# Patient Record
(demographics unavailable — no encounter records)

---

## 2024-11-22 NOTE — PLAN
[FreeTextEntry1] : [facility] very early pregnancy will need follow up sono to contfrm viability and determine EDC prenatal bloods drawn n/v 2 wks.

## 2024-11-22 NOTE — HISTORY OF PRESENT ILLNESS
[FreeTextEntry1] : 21 yo   for 9  months having somewhat irreg cycles. 28-35 day interval no sig med hx on no meds NKDA pos preg test at home LMP 10/06/24 had scant pink on toilet tissue once, nothing since no cramping

## 2024-11-22 NOTE — PHYSICAL EXAM
[Chaperone Present] : A chaperone was present in the examining room during all aspects of the physical examination [[id number]] : A chaperone was present during the pelvic exam. [Soft] : soft [Non-tender] : non-tender [Non-distended] : non-distended [No Mass] : no mass [Oriented x3] : oriented x3 [Examination Of The Breasts] : a normal appearance [No Masses] : no breast masses were palpable [Labia Majora] : normal [Labia Minora] : normal [No Bleeding] : There was no active vaginal bleeding [Normal] : normal [Uterine Adnexae] : normal

## 2024-11-25 NOTE — PHYSICAL EXAM
[Chaperone Present] : A chaperone was present in the examining room during all aspects of the physical examination [[id number]] : A chaperone was present during the pelvic exam. [Labia Majora] : normal [Labia Minora] : normal [Normal] : normal [Uterine Adnexae] : normal [FreeTextEntry2] : Matthias

## 2024-11-25 NOTE — PROCEDURE
[Transvaginal OB Sonogram] : Transvaginal OB Sonogram [Intrauterine Pregnancy] : intrauterine pregnancy [Transvaginal OB Sonogram WNL] : Transvaginal OB Sonogram WNL [Yolk Sac] : no yolk sac [Fetal Heart] : no fetal heart [FreeTextEntry1] : normal size uterus, no sac, no ff, no masses

## 2024-11-25 NOTE — PLAN
[FreeTextEntry1] : 19 y/o p0000 with likely completed ab stable hcg sent from office folate precautions reviewed f/u blood type time 20 min

## 2024-11-25 NOTE — HISTORY OF PRESENT ILLNESS
[FreeTextEntry1] : 19 y/o p0000 LMP 10/6/23 here for evaluation of staining, menstrual amount, some cramps.  seen other day, noted to have IUP  no med or surgical hx  blood type pending tested neg  non smoker